# Patient Record
(demographics unavailable — no encounter records)

---

## 2024-10-17 NOTE — HISTORY OF PRESENT ILLNESS
[FreeTextEntry1] : 64 y/o M with PMH pAF (remote history, with brief episode when patient had COVID PNA in January 2021, not on AC), thalassemia, DJD and arthritis of neck, hypertriglyceridemia presents for followup. He was previously seen 9/22/23 for routine follow up with no changes in management. He saw [facility] 12/2023 and underwent exercise stress test (7 minutes, 103% MPHR, negative for ischemia) and TTE with normal LVEF, no significant valve disease. Was also planned for CT calcium score, which is pending. He states he wants to follow here going forward due to insurance issues. Patient works as a . Patient is able to exercise; walks or bikes 1-2 miles a few times a week with no symptoms.  Social Hx: Denies smoking. +Occasional ETOH. Denies illicit drug use.  Family Hx: Denies family history of premature CAD or sudden cardiac death.

## 2024-10-17 NOTE — DISCUSSION/SUMMARY
[___ Year(s)] : in [unfilled] year(s) [FreeTextEntry1] : 62 y/o M with PMH pAF (remote history, with brief episode when patient had COVID PNA in January 2021, not on AC), thalassemia, DJD and arthritis of neck, hypertriglyceridemia presents for followup.   #pAF Currently in SR Patient reports significant symptoms related to episodes of AF Previously refused ILR for longterm monitoring  CHADSVASC 0, on ASA 81mg   #Hypertriglyceridemia , LDL at goal  Has upcoming calcium score ordered by outside cardiologist Diet, lifestyle modifications discussed Labs to be repeated by PCP in 3 months  [EKG obtained to assist in diagnosis and management of assessed problem(s)] : EKG obtained to assist in diagnosis and management of assessed problem(s)

## 2025-01-29 NOTE — PHYSICAL EXAM
[General Appearance - Alert] : alert [General Appearance - In No Acute Distress] : in no acute distress [Sclera] : the sclera and conjunctiva were normal [PERRL With Normal Accommodation] : pupils were equal in size, round, and reactive to light [Extraocular Movements] : extraocular movements were intact [Outer Ear] : the ears and nose were normal in appearance [Oropharynx] : the oropharynx was normal [Neck Appearance] : the appearance of the neck was normal [Neck Cervical Mass (___cm)] : no neck mass was observed [Jugular Venous Distention Increased] : there was no jugular-venous distention [Thyroid Diffuse Enlargement] : the thyroid was not enlarged [Thyroid Nodule] : there were no palpable thyroid nodules [Auscultation Breath Sounds / Voice Sounds] : lungs were clear to auscultation bilaterally [Heart Rate And Rhythm] : heart rate was normal and rhythm regular [Heart Sounds] : normal S1 and S2 [Heart Sounds Gallop] : no gallops [Murmurs] : no murmurs [Heart Sounds Pericardial Friction Rub] : no pericardial rub [Arterial Pulses Carotid] : carotid pulses were normal with no bruits [Arterial Pulses Femoral] : femoral pulses were normal without bruits [Full Pulse] : the pedal pulses are present [Edema] : there was no peripheral edema [Veins - Varicosity Changes] : there were no varicosital changes [Bowel Sounds] : normal bowel sounds [Abdomen Soft] : soft [Abdomen Tenderness] : non-tender [Abdomen Mass (___ Cm)] : no abdominal mass palpated [Cervical Lymph Nodes Enlarged Posterior Bilaterally] : posterior cervical [Cervical Lymph Nodes Enlarged Anterior Bilaterally] : anterior cervical [Supraclavicular Lymph Nodes Enlarged Bilaterally] : supraclavicular [Axillary Lymph Nodes Enlarged Bilaterally] : axillary [Femoral Lymph Nodes Enlarged Bilaterally] : femoral [Inguinal Lymph Nodes Enlarged Bilaterally] : inguinal [No Spinal Tenderness] : no spinal tenderness [Abnormal Walk] : normal gait [Nail Clubbing] : no clubbing  or cyanosis of the fingernails [Musculoskeletal - Swelling] : no joint swelling seen [Motor Tone] : muscle strength and tone were normal [Skin Color & Pigmentation] : normal skin color and pigmentation [Skin Turgor] : normal skin turgor [] : no rash [Cranial Nerves] : cranial nerves 2-12 were intact [No Focal Deficits] : no focal deficits [Oriented To Time, Place, And Person] : oriented to person, place, and time [Impaired Insight] : insight and judgment were intact [Affect] : the affect was normal [FreeTextEntry1] : By urology [Over the Past 2 Weeks, Have You Felt Down, Depressed, or Hopeless?] : 1.) Over the past 2 weeks, have you felt down, depressed, or hopeless? No [Over the Past 2 Weeks, Have You Felt Little Interest or Pleasure Doing Things?] : 2.) Over the past 2 weeks, have you felt little interest or pleasure doing things? No

## 2025-01-29 NOTE — ASSESSMENT
[FreeTextEntry1] : 66-year-old male with good general health with a bilateral renal masses which is being followed by . Follow-up CAT scan March 2022 stable with follow-up ultrasound August 2024 showing minimal increase in size of right renal neoplasm.  Follow-up in 1 year.  Patient also with BPH with lower urinary tract symptoms with improved symptoms on tamsulosin and finasteride which will be continued. PSA will be rechecked. He does complain of erectile dysfunction  Status post atrial flutter November 2019 with conversion to sinus rhythm with patient clinically remaining in sinus rhythm. Continue present treatment including aspirin and Toprol will followup with cardiology. Patient with CHADs score = 0 therefore no need for anticoagulation. Recent cardiac workup December 2023 with negative stress test and normal echocardiogram with patient to do coronary calcium CAT scan.  Again given referral  History of recurrent prostatitis, with no  recent episodes.  Patient with hepatosplenomegaly with GI evaluation feeling hepatomegaly secondary to fatty liver and splenomegaly related to thalassemia.  Continues to decline all vaccines including influenza, COVID, Shingrix and Prevnar 20. He did receive Tdap June 2023  Colonoscopy-The patient has declined.States he did Cologuard stool test May 2021 via GI which was negative. Patient again told he needs colonoscopy but continues to decline.  Stool FIT test given which he never did.  Agrees to do colonoscopy therefore referral given to GI.  Venipuncture done today in the office  Followup yearly and as needed

## 2025-01-29 NOTE — HEALTH RISK ASSESSMENT
[Very Good] : ~his/her~  mood as very good [Yes] : Yes [2 - 4 times a month (2 pts)] : 2-4 times a month (2 points) [1 or 2 (0 pts)] : 1 or 2 (0 points) [Never (0 pts)] : Never (0 points) [No] : In the past 12 months have you used drugs other than those required for medical reasons? No [No falls in past year] : Patient reported no falls in the past year [0] : 2) Feeling down, depressed, or hopeless: Not at all (0) [PHQ-2 Negative - No further assessment needed] : PHQ-2 Negative - No further assessment needed [Never] : Never [Patient reported colonoscopy was normal] : Patient reported colonoscopy was normal [None] : None [With Significant Other] : lives with significant other [With Family] : lives with family [# of Members in Household ___] :  household currently consist of [unfilled] member(s) [Employed] : employed [College] : College [] :  [# Of Children ___] : has [unfilled] children [Sexually Active] : sexually active [Feels Safe at Home] : Feels safe at home [Fully functional (bathing, dressing, toileting, transferring, walking, feeding)] : Fully functional (bathing, dressing, toileting, transferring, walking, feeding) [Fully functional (using the telephone, shopping, preparing meals, housekeeping, doing laundry, using] : Fully functional and needs no help or supervision to perform IADLs (using the telephone, shopping, preparing meals, housekeeping, doing laundry, using transportation, managing medications and managing finances) [Smoke Detector] : smoke detector [Carbon Monoxide Detector] : carbon monoxide detector [Seat Belt] :  uses seat belt [Sunscreen] : uses sunscreen [Reviewed no changes] : Reviewed, no changes [Discussed at today's visit] : Advance Directives Discussed at today's visit [Designated Healthcare Proxy] : Designated healthcare proxy [Name: ___] : Health Care Proxy's Name: [unfilled]  [NO] : No [Audit-CScore] : 2 [de-identified] : occ exercise [de-identified] : good [[id number]] : 0 [Change in mental status noted] : No change in mental status noted [Reports changes in hearing] : Reports no changes in hearing [Reports changes in vision] : Reports no changes in vision [Reports changes in dental health] : Reports no changes in dental health [ColonoscopyDate] : 05/21 [ColonoscopyComments] : negative Cologuard [FreeTextEntry2] :  [AdvancecareDate] : 01/25

## 2025-01-29 NOTE — HISTORY OF PRESENT ILLNESS
[FreeTextEntry1] : 66-year-old male with good general health presents for his yearly physical.  The patient had a significant issue November 11, 2019 when he presented here with lightheadedness and found to be in rapid atrial flutter. Patient sent to the ER and admitted and converted to sinus rhythm with medication therefore did not need cardioversion. He has since followed up with cardiology with decreased dose of Toprol from 50 mg daily to 25 mg 1/2 tab daily. Cardiology follow-up December 2023 with palpitations with negative stress test and normal echocardiogram with planned coronary calcium CAT scan.  No further palpitations. He followed up with cardiology October 2024 finding him stable and as previously recommended he do coronary calcium CAT scan which she has not done as yet.  History includes bilateral renal neoplasm for which he follows with urology Dr Abraham. CAT scan done October 2018 shows slight increased size and growth. Appointment to discuss options occurred including partial nephrectomy and continued surveillance. The patient opts for continued surveillance.  Follow-up CAT scan March 2022 shows kidneys to be stable. He followed up with urology August 2024 showing a left renal cyst that was stable and a right renal neoplasm slightly larger increasing from 1 cm to 1.2 cm.  Follow-up in 1 year.  Patient also with BPH with lower urinary tract symptoms with urology starting both tamsulosin and finasteride January 2022 with improved symptoms and decreased PSA as expected.  The patient also has had recurrent episodes of prostatitis treated by urology. No episodes  recently.  Previous scanning showed had mild hepatomegaly with sonogram showing mild hepatosplenomegaly. The patient saw gastroenterology who feels the hepatomegaly is mild and likely secondary to fatty liver and of no issue as long as liver functions are normal. Also feel splenomegaly is secondary to thalassemia.  Patient is generally feeling well without complaints including no chest pain, palpitations, shortness of breath or edema.  The patient did fall early 2019 sustaining a fracture of the rib which has healed with no further symptoms.  He is status post bilateral inguinal and umbilical hernia repair July 2021 which went well  Continues to decline all vaccines including COVID and influenza  He is  and works as a  and has 1 son who is a musician and does music production.

## 2025-03-10 NOTE — PHYSICAL EXAM
[No Acute Distress] : no acute distress [No Respiratory Distress] : no respiratory distress  [Normal Rate] : normal rate  [Regular Rhythm] : with a regular rhythm [Normal Affect] : the affect was normal [Normal Mood] : the mood was normal [Normal Outer Ear/Nose] : the outer ears and nose were normal in appearance [Normal TMs] : both tympanic membranes were normal [Normal Nasal Mucosa] : the nasal mucosa was normal [Supple] : supple [de-identified] : +PND [de-identified] : +slight apical wheezing w/o rales or rhonchi, +cough noted

## 2025-03-10 NOTE — ASSESSMENT
[FreeTextEntry1] : Patient prescribed doxycycline x 10 days/Tessalon, viral swab sent  .Patient advised to rest/increase fluids and use supportive therapy. Patient will call if symptoms persist or worsen and return to the office as scheduled for regular followup.   Dr. Ambrose was present in office building while I examined patient

## 2025-03-10 NOTE — HISTORY OF PRESENT ILLNESS
[FreeTextEntry8] : Patient presents not feeling well.  Patient states that started with 2 weeks ago with cough and fever around 100-102.  Patient took OTC medication and thought he recovered but 4 to 5 days ago started with cough and low-grade temp again of 100 with congestion.  Patient states his wife had a similar illness, no COVID exposure but did not take a COVID test.